# Patient Record
Sex: MALE | Race: WHITE | HISPANIC OR LATINO | URBAN - METROPOLITAN AREA
[De-identification: names, ages, dates, MRNs, and addresses within clinical notes are randomized per-mention and may not be internally consistent; named-entity substitution may affect disease eponyms.]

---

## 2019-06-27 ENCOUNTER — SEXUAL HEALTH (OUTPATIENT)
Dept: SURGERY | Facility: CLINIC | Age: 34
End: 2019-06-27

## 2019-06-27 DIAGNOSIS — Z11.3 SCREEN FOR STD (SEXUALLY TRANSMITTED DISEASE): Primary | ICD-10-CM

## 2019-08-22 ENCOUNTER — SEXUAL HEALTH (OUTPATIENT)
Dept: SURGERY | Facility: CLINIC | Age: 34
End: 2019-08-22

## 2019-08-22 DIAGNOSIS — Z71.2 ENCOUNTER TO DISCUSS TEST RESULTS: Primary | ICD-10-CM

## 2019-08-22 NOTE — PROGRESS NOTES
Pt here for STD/HIV results  GC/CT, RPR, HIV all negative - reviewed and given to pt  Pt re-educated on safe sex practices  Pt stated understanding